# Patient Record
Sex: MALE | URBAN - METROPOLITAN AREA
[De-identification: names, ages, dates, MRNs, and addresses within clinical notes are randomized per-mention and may not be internally consistent; named-entity substitution may affect disease eponyms.]

---

## 2020-08-10 ENCOUNTER — HISTORICAL (OUTPATIENT)
Dept: ADMINISTRATIVE | Facility: HOSPITAL | Age: 35
End: 2020-08-10

## 2020-08-10 LAB
ABS NEUT (OLG): 2.9 X10(3)/MCL (ref 2.1–9.2)
ALBUMIN SERPL-MCNC: 4.7 GM/DL (ref 3.4–5)
ALBUMIN/GLOB SERPL: 1.96 {RATIO} (ref 1.5–2.5)
ALP SERPL-CCNC: 45 UNIT/L (ref 38–126)
ALT SERPL-CCNC: 41 UNIT/L (ref 7–52)
AST SERPL-CCNC: 26 UNIT/L (ref 15–37)
BILIRUB SERPL-MCNC: 0.9 MG/DL (ref 0.2–1)
BILIRUBIN DIRECT+TOT PNL SERPL-MCNC: 0.2 MG/DL (ref 0–0.5)
BILIRUBIN DIRECT+TOT PNL SERPL-MCNC: 0.7 MG/DL
BUN SERPL-MCNC: 12 MG/DL (ref 7–18)
CALCIUM SERPL-MCNC: 9.7 MG/DL (ref 8.5–10.1)
CHLORIDE SERPL-SCNC: 102 MMOL/L (ref 98–107)
CHOLEST SERPL-MCNC: 171 MG/DL (ref 0–200)
CHOLEST/HDLC SERPL: 3.7 {RATIO}
CO2 SERPL-SCNC: 26 MMOL/L (ref 21–32)
CREAT SERPL-MCNC: 1.04 MG/DL (ref 0.6–1.3)
ERYTHROCYTE [DISTWIDTH] IN BLOOD BY AUTOMATED COUNT: 13.1 % (ref 11.5–17)
GLOBULIN SER-MCNC: 2.4 GM/DL (ref 1.2–3)
GLUCOSE SERPL-MCNC: 96 MG/DL (ref 74–106)
HCT VFR BLD AUTO: 44.3 % (ref 42–52)
HDLC SERPL-MCNC: 46 MG/DL (ref 35–60)
HGB BLD-MCNC: 15 GM/DL (ref 14–18)
LDLC SERPL CALC-MCNC: 101 MG/DL (ref 0–129)
LYMPHOCYTES # BLD AUTO: 1.9 X10(3)/MCL (ref 0.6–3.4)
LYMPHOCYTES NFR BLD AUTO: 36 % (ref 13–40)
MCH RBC QN AUTO: 30.2 PG (ref 27–31.2)
MCHC RBC AUTO-ENTMCNC: 34 GM/DL (ref 32–36)
MCV RBC AUTO: 89 FL (ref 80–94)
MONOCYTES # BLD AUTO: 0.5 X10(3)/MCL (ref 0.1–1.3)
MONOCYTES NFR BLD AUTO: 8.5 % (ref 0.1–24)
NEUTROPHILS NFR BLD AUTO: 55.5 % (ref 47–80)
PLATELET # BLD AUTO: 260 X10(3)/MCL (ref 130–400)
PMV BLD AUTO: 9.8 FL (ref 9.4–12.4)
POTASSIUM SERPL-SCNC: 4.5 MMOL/L (ref 3.5–5.1)
PROT SERPL-MCNC: 7.1 GM/DL (ref 6.4–8.2)
RBC # BLD AUTO: 4.97 X10(6)/MCL (ref 4.7–6.1)
SODIUM SERPL-SCNC: 138 MMOL/L (ref 136–145)
TRIGL SERPL-MCNC: 84 MG/DL (ref 30–150)
TSH SERPL-ACNC: 1.79 MIU/ML (ref 0.35–4.94)
VLDLC SERPL CALC-MCNC: 16.8 MG/DL
WBC # SPEC AUTO: 5.3 X10(3)/MCL (ref 4.5–11.5)

## 2020-08-31 ENCOUNTER — HISTORICAL (OUTPATIENT)
Dept: CARDIOLOGY | Facility: HOSPITAL | Age: 35
End: 2020-08-31

## 2022-04-10 ENCOUNTER — HISTORICAL (OUTPATIENT)
Dept: ADMINISTRATIVE | Facility: HOSPITAL | Age: 37
End: 2022-04-10

## 2022-04-25 VITALS
HEIGHT: 69 IN | SYSTOLIC BLOOD PRESSURE: 126 MMHG | DIASTOLIC BLOOD PRESSURE: 84 MMHG | WEIGHT: 182.31 LBS | BODY MASS INDEX: 27 KG/M2

## 2022-05-03 NOTE — HISTORICAL [facility] CERNER
This is a historical note converted from [employer]. Formatting and pictures may have been removed.  Please reference [employer] for original formatting and attached multimedia. Chief Complaint  OP WELLNESS CPX FAST, EVAL B/P READINGS, REPEAT EKG  History of Present Illness  34 year old WM presents to re-establish care  No PMH, No Meds  ?  Single, Active in [employer] (applying for [employer])  No Tob, EtOH: rare  Exercise: run/swim/cycle/wts 4-5x/week  Diet: little red meat, low fat  Review of Systems  Constitutional:?no fever, fatigue, weakness  Eye:?no vision loss, eye redness, drainage, or pain  ENMT:?no sore throat, ear pain, sinus pain/congestion  Respiratory:?no cough, no wheezing, no shortness of breath  Cardiovascular:?no chest pain, no palpitations, no edema  Gastrointestinal:?no nausea, vomiting, or diarrhea. No abdominal pain  Genitourinary:?no dysuria, no urinary frequency or urgency, no hematuria  Hema/Lymph:?no abnormal bruising or bleeding  Endocrine:?no heat or cold intolerance, no excessive thirst or excessive urination  Musculoskeletal:?no muscle or joint pain, no joint swelling  Integumentary:?no skin rash or abnormal lesion  Neurologic: no headache, no dizziness, no weakness or numbness  ?  Physical Exam  Vitals & Measurements  T:?36.8? ?C (Oral)? HR:?52(Peripheral)? BP:?126/84?  HT:?175?cm? HT:?175.00?cm? WT:?82.7?kg? WT:?82.700?kg? BMI:?27?  General:?well-developed well-nourished in no acute distress  Eye: PERRLA, EOMI, clear conjunctiva, eyelids normal  HENT:?TMs/ear canals clear, oropharynx without erythema/exudate, oropharynx and nasal mucosal surfaces moist, no maxillary/frontal sinus tenderness to palpation  Neck: full range of motion, no thyromegaly or lymphadenopathy  Respiratory:?clear to auscultation bilaterally  Cardiovascular:?regular rate and rhythm without murmurs, gallops or rubs  Gastrointestinal:?soft, non-tender, non-distended with normal bowel sounds, without masses to  palpation  Genitourinary: no CVA tenderness to palpation  Musculoskeletal:?full range of motion of all extremities/spine without limitation or discomfort  Integumentary: no rashes or skin lesions present  Neurologic: cranial nerves intact, no signs of peripheral neurological deficit, motor/sensory function intact  ?  Assessment/Plan  1.?Wellness examination?Z00.00  ?LABS: CBC, CMP, TSH, FLP  Ordered:  Automated Diff, Routine collect, 08/10/20 10:21:00 CDT, Blood, Collected, Stop date 08/10/20 10:21:00 CDT, Lab Collect, Wellness examination, 08/10/20 10:21:00 CDT  CBC w/ Auto Diff, Routine collect, 08/10/20 10:21:00 CDT, Blood, Stop date 08/10/20 10:21:00 CDT, Lab Collect, Wellness examination, 08/10/20 10:21:00 CDT  Comprehensive Metabolic Panel, Routine collect, 08/10/20 10:21:00 CDT, Blood, Stop date 08/10/20 10:21:00 CDT, Lab Collect, Wellness examination, 08/10/20 10:21:00 CDT  Lipid Panel, Routine collect, 08/10/20 10:21:00 CDT, Blood, Stop date 08/10/20 10:21:00 CDT, Lab Collect, Wellness examination, 08/10/20 10:21:00 CDT  Thyroid Stimulating Hormone, Routine collect, 08/10/20 10:21:00 CDT, Blood, Stop date 08/10/20 10:21:00 CDT, Lab Collect, Wellness examination, 08/10/20 10:21:00 CDT  ?   Problem List/Past Medical History  Ongoing  No chronic problems  Historical  No qualifying data  Procedure/Surgical History  Extraction of impacted wisdom tooth  LASIK  Tonsillectomy and adenoidectomy   Medications  No active medications  Allergies  amoxicillin?(Unknown)  Social History  Abuse/Neglect  No, 08/10/2020  Alcohol  Current, 1-2 times per week, 08/10/2020  Employment/School  Unemployed, 08/10/2020  Exercise  Exercise duration: 180. Exercise frequency: 5-6 times/week. Exercise type: Running, Swimming, Weight lifting., 08/10/2020  Home/Environment  Lives with Father, Mother., 08/10/2020  Nutrition/Health  Regular, 08/10/2020  Tobacco  Never (less than 100 in lifetime), N/A, 08/10/2020  Family History  CAD -  Coronary artery disease: Father.  Hyperlipidemia.: Mother and Father.  Hypertension.: Father.  Health Maintenance  Health Maintenance  ???Pending?(in the next year)  ??? ??OverDue  ??? ? ? ?Alcohol Misuse Screening due??01/02/20??and every 1??year(s)  ??? ??Due?  ??? ? ? ?ADL Screening due??08/10/20??and every 1??year(s)  ??? ? ? ?Tetanus Vaccine due??08/10/20??and every 10??year(s)  ??? ??Due In Future?  ??? ? ? ?Obesity Screening not due until??01/01/21??and every 1??year(s)  ???Satisfied?(in the past 1 year)  ??? ??Satisfied?  ??? ? ? ?Blood Pressure Screening on??08/10/20.??Satisfied by Nia Nielsen LPN  ??? ? ? ?Body Mass Index Check on??08/10/20.??Satisfied by Nia Nielsen LPN  ??? ? ? ?Depression Screening on??08/10/20.??Satisfied by Nia Nielsen LPN  ??? ? ? ?Obesity Screening on??08/10/20.??Satisfied by Nia Nielsen LPN  ?      Patient condition discussed?in detail with nurse practitioner.? Agree with plan of care?and follow-up.